# Patient Record
Sex: FEMALE | Race: WHITE | Employment: OTHER | URBAN - METROPOLITAN AREA
[De-identification: names, ages, dates, MRNs, and addresses within clinical notes are randomized per-mention and may not be internally consistent; named-entity substitution may affect disease eponyms.]

---

## 2017-09-28 ENCOUNTER — HOSPITAL ENCOUNTER (OUTPATIENT)
Dept: NUCLEAR MEDICINE | Age: 57
Discharge: HOME | End: 2017-09-28
Attending: INTERNAL MEDICINE
Payer: MEDICARE

## 2017-09-28 DIAGNOSIS — K21.9 GERD WITHOUT ESOPHAGITIS: ICD-10-CM

## 2017-09-28 PROCEDURE — 78264 GASTRIC EMPTYING IMG STUDY: CPT

## 2018-05-21 PROBLEM — D35.2 PROLACTINOMA (HCC): Status: ACTIVE | Noted: 2018-05-21

## 2018-08-13 ENCOUNTER — HOSPITAL ENCOUNTER (OUTPATIENT)
Dept: LAB | Age: 58
Discharge: HOME | End: 2018-08-13

## 2018-08-13 PROCEDURE — 88305 TISSUE EXAM BY PATHOLOGIST: CPT

## 2018-11-05 ENCOUNTER — HOSPITAL ENCOUNTER (OUTPATIENT)
Dept: MRI IMAGING | Age: 58
Discharge: HOME | End: 2018-11-05
Attending: INTERNAL MEDICINE
Payer: MEDICARE

## 2018-11-05 DIAGNOSIS — D35.2 PROLACTINOMA (HCC): ICD-10-CM

## 2018-11-05 PROCEDURE — 70553 MRI BRAIN STEM W/O & W/DYE: CPT

## 2018-11-05 PROCEDURE — A9575 INJ GADOTERATE MEGLUMI 0.1ML: HCPCS | Performed by: INTERNAL MEDICINE

## 2018-11-05 PROCEDURE — 74011250636 HC RX REV CODE- 250/636: Performed by: INTERNAL MEDICINE

## 2018-11-05 PROCEDURE — 74011000258 HC RX REV CODE- 258: Performed by: INTERNAL MEDICINE

## 2018-11-05 RX ORDER — SODIUM CHLORIDE 0.9 % (FLUSH) 0.9 %
10 SYRINGE (ML) INJECTION
Status: COMPLETED | OUTPATIENT
Start: 2018-11-05 | End: 2018-11-05

## 2018-11-05 RX ORDER — GADOTERATE MEGLUMINE 376.9 MG/ML
15 INJECTION INTRAVENOUS
Status: COMPLETED | OUTPATIENT
Start: 2018-11-05 | End: 2018-11-05

## 2018-11-05 RX ADMIN — GADOTERATE MEGLUMINE 15 ML: 376.9 INJECTION INTRAVENOUS at 12:26

## 2018-11-05 RX ADMIN — Medication 10 ML: at 12:26

## 2018-11-05 RX ADMIN — SODIUM CHLORIDE 100 ML: 900 INJECTION, SOLUTION INTRAVENOUS at 12:26

## 2019-08-05 ENCOUNTER — HOSPITAL ENCOUNTER (OUTPATIENT)
Dept: ULTRASOUND IMAGING | Age: 59
Discharge: HOME | End: 2019-08-05
Attending: INTERNAL MEDICINE
Payer: MEDICARE

## 2019-08-05 DIAGNOSIS — R22.1 FULLNESS OF NECK: ICD-10-CM

## 2019-08-05 PROCEDURE — 76536 US EXAM OF HEAD AND NECK: CPT

## 2019-08-05 NOTE — PROGRESS NOTES
Spoke with pt informed her that the area looks like a lipoma. She needs to let us know if it's getting larger.

## 2019-11-04 ENCOUNTER — HOSPITAL ENCOUNTER (OUTPATIENT)
Dept: MRI IMAGING | Age: 59
Discharge: HOME | End: 2019-11-04
Attending: INTERNAL MEDICINE
Payer: MEDICARE

## 2019-11-04 DIAGNOSIS — D35.2 PROLACTINOMA (HCC): ICD-10-CM

## 2019-11-04 DIAGNOSIS — E23.6 PITUITARY CYST (HCC): ICD-10-CM

## 2019-11-04 PROCEDURE — 74011000258 HC RX REV CODE- 258: Performed by: INTERNAL MEDICINE

## 2019-11-04 PROCEDURE — A9575 INJ GADOTERATE MEGLUMI 0.1ML: HCPCS | Performed by: INTERNAL MEDICINE

## 2019-11-04 PROCEDURE — 74011250636 HC RX REV CODE- 250/636: Performed by: INTERNAL MEDICINE

## 2019-11-04 PROCEDURE — 70553 MRI BRAIN STEM W/O & W/DYE: CPT

## 2019-11-04 RX ORDER — SODIUM CHLORIDE 0.9 % (FLUSH) 0.9 %
10 SYRINGE (ML) INJECTION
Status: COMPLETED | OUTPATIENT
Start: 2019-11-04 | End: 2019-11-04

## 2019-11-04 RX ORDER — GADOTERATE MEGLUMINE 376.9 MG/ML
16 INJECTION INTRAVENOUS
Status: COMPLETED | OUTPATIENT
Start: 2019-11-04 | End: 2019-11-04

## 2019-11-04 RX ADMIN — Medication 10 ML: at 10:49

## 2019-11-04 RX ADMIN — SODIUM CHLORIDE 100 ML: 900 INJECTION, SOLUTION INTRAVENOUS at 10:49

## 2019-11-04 RX ADMIN — GADOTERATE MEGLUMINE 16 ML: 376.9 INJECTION INTRAVENOUS at 10:49

## 2021-11-02 ENCOUNTER — HOSPITAL ENCOUNTER (OUTPATIENT)
Dept: MRI IMAGING | Age: 61
Discharge: HOME | End: 2021-11-02
Attending: INTERNAL MEDICINE
Payer: MEDICARE

## 2021-11-02 DIAGNOSIS — E23.6 PITUITARY CYST (HCC): ICD-10-CM

## 2021-11-02 PROCEDURE — 70553 MRI BRAIN STEM W/O & W/DYE: CPT

## 2021-11-02 PROCEDURE — A9576 INJ PROHANCE MULTIPACK: HCPCS | Performed by: INTERNAL MEDICINE

## 2021-11-02 PROCEDURE — 74011000258 HC RX REV CODE- 258: Performed by: INTERNAL MEDICINE

## 2021-11-02 PROCEDURE — 74011250636 HC RX REV CODE- 250/636: Performed by: INTERNAL MEDICINE

## 2021-11-02 RX ORDER — SODIUM CHLORIDE 0.9 % (FLUSH) 0.9 %
10 SYRINGE (ML) INJECTION
Status: COMPLETED | OUTPATIENT
Start: 2021-11-02 | End: 2021-11-02

## 2021-11-02 RX ADMIN — Medication 10 ML: at 13:57

## 2021-11-02 RX ADMIN — GADOTERIDOL 15 ML: 279.3 INJECTION, SOLUTION INTRAVENOUS at 13:57

## 2021-11-02 RX ADMIN — SODIUM CHLORIDE 100 ML: 900 INJECTION, SOLUTION INTRAVENOUS at 13:58

## 2022-03-19 PROBLEM — D35.2 PROLACTINOMA (HCC): Status: ACTIVE | Noted: 2018-05-21

## 2022-06-03 DIAGNOSIS — E03.9 PRIMARY HYPOTHYROIDISM: Primary | ICD-10-CM

## 2022-06-03 DIAGNOSIS — E03.9 PRIMARY HYPOTHYROIDISM: ICD-10-CM

## 2022-06-03 DIAGNOSIS — D35.2 PROLACTINOMA (HCC): Primary | ICD-10-CM

## 2022-06-03 DIAGNOSIS — E23.6 PITUITARY CYST (HCC): ICD-10-CM

## 2022-08-19 ENCOUNTER — HOSPITAL ENCOUNTER (OUTPATIENT)
Dept: LAB | Age: 62
Setting detail: SPECIMEN
Discharge: HOME | End: 2022-08-22

## 2022-08-19 PROCEDURE — 88305 TISSUE EXAM BY PATHOLOGIST: CPT

## 2022-08-19 PROCEDURE — 88312 SPECIAL STAINS GROUP 1: CPT

## 2022-11-08 DIAGNOSIS — D35.2 PROLACTINOMA (HCC): ICD-10-CM

## 2022-11-08 DIAGNOSIS — E23.6 PITUITARY CYST (HCC): ICD-10-CM

## 2022-11-08 LAB
PROLACTIN SERPL-MCNC: 8.2 NG/ML
TSH W FREE THYROID IF ABNORMAL: 1.83 UIU/ML (ref 0.36–3.74)

## 2022-11-13 NOTE — PROGRESS NOTES
Alexei Beard MD, [facility]            Reason for visit: Follow-up of hypothyroidism and a pituitary cyst      ASSESSMENT AND PLAN:    1. Primary hypothyroidism  She is biochemically euthyroid. Continue levothyroxine as prescribed. Return in 1 year with labs. - levothyroxine (SYNTHROID) 100 MCG tablet; Take 1 tablet by mouth every morning (before breakfast)  Dispense: 90 tablet; Refill: 3  - TSH; Future  - T4, Free; Future    2. Pituitary cyst [facility])  She has a small pituitary cyst incidentally noted on MRI ordered in 2018 as part of the evaluation of dizziness. The cyst increased slightly in size from  through . I will repeat MRI now. - Prolactin; Future  - MRI PITUITARY W WO CONTRAST; Future  - diazePAM (VALIUM) 5 MG tablet; Take 1 tablet on-call to MRI. May repeat once. Dispense: 2 tablet; Refill: 0      Follow-up and Dispositions    Return in about 1 year (around 2023). History of Present Illness:    PITUITARY DISEASE  Masoud Hankins is here for follow-up of a cystic pituitary lesion incidentally noted on MRI brain 2018 (ordered for evaluation of tinnitus, dizziness, headaches). She is grieving the unexpected death of her  due to COVID-19 in 2021. Current symptoms: See review of systems below     Pregnancy/menstrual history: She is . She had subfertility (it took ~2 years for her to achieve pregnancy). She underwent ALESSANDRO (ovaries intact) in the mid . She started experiencing hot flashes in her mid-40s and has taken estrogen/testosterone replacement since then. Pertinent comorbidities:  Current opioid use: Yes. (methadone for chronic pain since ~)     Current/prior corticosteroid use: Yes.  (she took prednisone for ~8 years in her 35s and has taken shorter courses since then)     Recreational drug use: No.     Significant alcohol use: No.     History of multiple endocrine neoplasia: No.     Imagin2018: MRI ([address] [place] [address])- 4 mm cystic lesion within the pituitary (cystic microadenoma versus Rathke's cleft cyst). 2018: MRI ([address] [place] [address])- 4 mm focus of T1 hyperintensity within the pars intermedia (intrasellar Rathke's cleft cyst versus complex pituitary cyst versus hemorrhagic pituitary microadenoma). 2019: MRI ([address] [place] [address])- 4 mm intrasellar lesion (pituitary microadenoma or complex Rathke's cleft cyst). 2021: MRI ([address] [place] [address])- 5.3 x 6.8 x 3.6 mm pituitary adenoma. Pituitary stalk is midline. Optic chiasm is unremarkable. Laboratory evaluation:  2018: Prolactin 24.6, IGF-I 94.  2018: Prolactin 16.1.  2019: Prolactin 19.0.  2020: Prolactin 11.7.  2021: Prolactin 20.9.  2022: Prolactin 8.2. Prior/current treatment: none        THYROID DYSFUNCTION  Tawana Chiu is seen for follow-up of hypothyroidism; this was diagnosed in her early 35s. Current symptoms: Review of systems below     Prior treatment: She has taken levothyroxine since diagnosis (100 mcg daily since at least ). Pertinent labs:  2011: TSH 2.316.  2014: TSH 1.95, free T4 0.89.  3/25/2015: TSH 2.29, free T4 0.79.  4/15/2016: TSH 0.66, free T4 1.01.  2017: TSH 1.680.  2018: TSH 5.680.  2018: TSH 3.770, free T4 1.28, antithyroid peroxidase antibodies 18 (-).  2018: TSH 2.940.  2019: TSH 2.590, free T4 1.31.  2020: TSH 1.136, free T4 1. 12.  2021: TSH 2.084.  2021: TSH 2.990, free T4 1.26.  2022: TSH 1.818.  2022: TSH 1.83. Imaging: none    Review of Systems   Constitutional:  Negative for fatigue and unexpected weight change (lost 21 pounds in the last year (she attributes to intentional reduction in caloric intake and increase in physical activity)). Positive for dry skin and hair loss. Eyes:  Negative for visual disturbance.    Cardiovascular:  Positive for palpitations (rare). Gastrointestinal:  Negative for constipation. Psychiatric/Behavioral:  Positive for sleep disturbance. /80   Pulse 74   Wt 142 lb (64.4 kg)   SpO2 94%   BMI 26.83 kg/m²   Wt Readings from Last 3 Encounters:   11/14/22 142 lb (64.4 kg)   11/12/21 163 lb (73.9 kg)       Physical Exam  HENT:      Head: Normocephalic. Neck:      Thyroid: No thyroid mass or thyromegaly. Cardiovascular:      Rate and Rhythm: Normal rate. Pulmonary:      Effort: No respiratory distress. Breath sounds: Normal breath sounds. Neurological:      Mental Status: She is alert. Psychiatric:         Mood and Affect: Mood normal.         Behavior: Behavior normal.       Orders Placed This Encounter   Procedures    MRI PITUITARY W WO CONTRAST     Standing Status:   Future     Standing Expiration Date:   11/14/2023     Order Specific Question:   STAT Creatinine as needed:     Answer:   Yes     Order Specific Question:   What is the sedation requirement? Answer:   None    TSH     Standing Status:   Future     Standing Expiration Date:   11/14/2024    T4, Free     Standing Status:   Future     Standing Expiration Date:   11/14/2024    Prolactin     Standing Status:   Future     Standing Expiration Date:   11/14/2024         Current Outpatient Medications   Medication Sig Dispense Refill    levothyroxine (SYNTHROID) 100 MCG tablet Take 1 tablet by mouth every morning (before breakfast) 90 tablet 3    diazePAM (VALIUM) 5 MG tablet Take 1 tablet on-call to MRI. May repeat once. 2 tablet 0    Calcium Carbonate-Vitamin D (OYSTER SHELL CALCIUM/D) 500-200 MG-UNIT TABS Take 1 tablet by mouth 2 times daily (with meals)      dicyclomine (BENTYL) 10 MG capsule Take 10 mg by mouth 3 times daily as needed      EPINEPHrine (EPIPEN) 0.3 MG/0.3ML SOAJ injection Epinephrine 0.3 mg/0.3 mL injection, auto-injector INJECT ONE PEN INTO THE THIGH OR AS DIRECTED BY PHYSICIAN. AFTER ADMINISTRATION CALL 911.  IF ANAPHYLACTIC SYMPTOMS PERSIST AFTER FIRST DOSE,      esomeprazole (NEXIUM) 40 MG delayed release capsule Take 40 mg by mouth daily      estrogens, conjugated,-methylTESTOSTERone (ESTRATEST) 1.25-2.5 MG per tablet Take 1 tablet by mouth daily. fluconazole (DIFLUCAN) 150 MG tablet May repeat after 3 days if needed. fluticasone (FLONASE) 50 MCG/ACT nasal spray 2 sprays by Nasal route daily      gabapentin (NEURONTIN) 300 MG capsule Take 300 mg by mouth daily. hydroCHLOROthiazide (HYDRODIURIL) 25 MG tablet Take 25 mg by mouth daily      lisinopril (PRINIVIL;ZESTRIL) 10 MG tablet Take 10 mg by mouth daily      loratadine (CLARITIN) 10 MG tablet Take by mouth      meloxicam (MOBIC) 7.5 MG tablet Take 7.5 mg by mouth daily      methadone (DOLOPHINE) 10 MG tablet Take 10 mg by mouth 4 times daily as needed. naloxone 4 MG/0.1ML LIQD nasal spray 4 mg      rOPINIRole (REQUIP) 1 MG tablet Take 1 mg by mouth       No current facility-administered medications for this visit. Jovani Sanders MD, FACE      Portions of this note were generated with the assistance of voice recognition software. As such, some errors in transcription may be present.

## 2022-11-14 ENCOUNTER — OFFICE VISIT (OUTPATIENT)
Dept: ENDOCRINOLOGY | Age: 62
End: 2022-11-14
Payer: MEDICARE

## 2022-11-14 VITALS
DIASTOLIC BLOOD PRESSURE: 80 MMHG | WEIGHT: 142 LBS | OXYGEN SATURATION: 94 % | BODY MASS INDEX: 26.83 KG/M2 | HEART RATE: 74 BPM | SYSTOLIC BLOOD PRESSURE: 118 MMHG

## 2022-11-14 DIAGNOSIS — E23.6 PITUITARY CYST (HCC): ICD-10-CM

## 2022-11-14 DIAGNOSIS — E03.9 PRIMARY HYPOTHYROIDISM: Primary | ICD-10-CM

## 2022-11-14 PROCEDURE — 3017F COLORECTAL CA SCREEN DOC REV: CPT | Performed by: INTERNAL MEDICINE

## 2022-11-14 PROCEDURE — 3078F DIAST BP <80 MM HG: CPT | Performed by: INTERNAL MEDICINE

## 2022-11-14 PROCEDURE — 3074F SYST BP LT 130 MM HG: CPT | Performed by: INTERNAL MEDICINE

## 2022-11-14 PROCEDURE — 4004F PT TOBACCO SCREEN RCVD TLK: CPT | Performed by: INTERNAL MEDICINE

## 2022-11-14 PROCEDURE — 99214 OFFICE O/P EST MOD 30 MIN: CPT | Performed by: INTERNAL MEDICINE

## 2022-11-14 PROCEDURE — G8484 FLU IMMUNIZE NO ADMIN: HCPCS | Performed by: INTERNAL MEDICINE

## 2022-11-14 PROCEDURE — G8427 DOCREV CUR MEDS BY ELIG CLIN: HCPCS | Performed by: INTERNAL MEDICINE

## 2022-11-14 PROCEDURE — G8419 CALC BMI OUT NRM PARAM NOF/U: HCPCS | Performed by: INTERNAL MEDICINE

## 2022-11-14 RX ORDER — DIAZEPAM 5 MG/1
TABLET ORAL
Qty: 2 TABLET | Refills: 0 | Status: SHIPPED | OUTPATIENT
Start: 2022-11-14 | End: 2023-01-01

## 2022-11-14 RX ORDER — LEVOTHYROXINE SODIUM 0.1 MG/1
100 TABLET ORAL
Qty: 90 TABLET | Refills: 3 | Status: SHIPPED | OUTPATIENT
Start: 2022-11-14

## 2022-11-14 ASSESSMENT — ENCOUNTER SYMPTOMS: CONSTIPATION: 0

## 2023-11-13 ENCOUNTER — TELEPHONE (OUTPATIENT)
Dept: ENDOCRINOLOGY | Age: 63
End: 2023-11-13

## 2023-11-13 DIAGNOSIS — D35.2 PROLACTINOMA (HCC): ICD-10-CM

## 2023-11-13 DIAGNOSIS — E23.6 PITUITARY CYST (HCC): Primary | ICD-10-CM

## 2023-11-13 NOTE — TELEPHONE ENCOUNTER
Patient was called to confirm her appointment and she stated that she wanted to reschedule because she hadn't got her mri. She need a new order for the mri and she also need a prescription for Valium in order to do the mri.

## 2023-11-14 RX ORDER — DIAZEPAM 5 MG/1
TABLET ORAL
Qty: 2 TABLET | Refills: 0 | Status: SHIPPED | OUTPATIENT
Start: 2023-11-14 | End: 2024-02-14

## 2023-12-14 ENCOUNTER — TELEPHONE (OUTPATIENT)
Dept: ENDOCRINOLOGY | Age: 63
End: 2023-12-14

## 2023-12-14 DIAGNOSIS — E23.6 PITUITARY CYST (HCC): ICD-10-CM

## 2023-12-14 DIAGNOSIS — D35.2 PROLACTINOMA (HCC): ICD-10-CM

## 2023-12-14 RX ORDER — DIAZEPAM 5 MG/1
TABLET ORAL
Qty: 2 TABLET | Refills: 0 | Status: SHIPPED | OUTPATIENT
Start: 2023-12-14 | End: 2024-03-15

## 2023-12-14 NOTE — TELEPHONE ENCOUNTER
Patient left a message stating the sedative was sent to the wrong pharmacy. Patient has MRI tomorrow and needs the medication. She wants it sent to [facility] in [place]. Patient would like a call back.

## 2023-12-15 ENCOUNTER — HOSPITAL ENCOUNTER (OUTPATIENT)
Dept: MRI IMAGING | Age: 63
End: 2023-12-15
Attending: INTERNAL MEDICINE
Payer: MEDICARE

## 2023-12-15 DIAGNOSIS — D35.2 PROLACTINOMA (HCC): ICD-10-CM

## 2023-12-15 DIAGNOSIS — E23.6 PITUITARY CYST (HCC): ICD-10-CM

## 2023-12-15 PROCEDURE — A9579 GAD-BASE MR CONTRAST NOS,1ML: HCPCS | Performed by: INTERNAL MEDICINE

## 2023-12-15 PROCEDURE — 2580000003 HC RX 258: Performed by: INTERNAL MEDICINE

## 2023-12-15 PROCEDURE — 6360000004 HC RX CONTRAST MEDICATION: Performed by: INTERNAL MEDICINE

## 2023-12-15 PROCEDURE — 70553 MRI BRAIN STEM W/O & W/DYE: CPT | Performed by: INTERNAL MEDICINE

## 2023-12-15 RX ORDER — SODIUM CHLORIDE 0.9 % (FLUSH) 0.9 %
10 SYRINGE (ML) INJECTION AS NEEDED
Status: ACTIVE | OUTPATIENT
Start: 2023-12-15

## 2023-12-15 RX ADMIN — SODIUM CHLORIDE, PRESERVATIVE FREE 10 ML: 5 INJECTION INTRAVENOUS at 16:41

## 2023-12-15 RX ADMIN — GADOTERIDOL 13 ML: 279.3 INJECTION, SOLUTION INTRAVENOUS at 16:41

## 2024-04-08 ENCOUNTER — OFFICE VISIT (OUTPATIENT)
Dept: ENDOCRINOLOGY | Age: 64
End: 2024-04-08
Payer: MEDICARE

## 2024-04-08 VITALS
SYSTOLIC BLOOD PRESSURE: 118 MMHG | DIASTOLIC BLOOD PRESSURE: 84 MMHG | WEIGHT: 142 LBS | BODY MASS INDEX: 26.83 KG/M2 | HEART RATE: 70 BPM

## 2024-04-08 DIAGNOSIS — E03.9 PRIMARY HYPOTHYROIDISM: ICD-10-CM

## 2024-04-08 DIAGNOSIS — E03.9 PRIMARY HYPOTHYROIDISM: Primary | ICD-10-CM

## 2024-04-08 DIAGNOSIS — E23.6 PITUITARY CYST (HCC): ICD-10-CM

## 2024-04-08 LAB
PROLACTIN SERPL-MCNC: 8.4 NG/ML
T4 FREE SERPL-MCNC: 1.2 NG/DL (ref 0.78–1.46)
TSH, 3RD GENERATION: 1 UIU/ML (ref 0.36–3.74)

## 2024-04-08 PROCEDURE — 3074F SYST BP LT 130 MM HG: CPT | Performed by: INTERNAL MEDICINE

## 2024-04-08 PROCEDURE — 3079F DIAST BP 80-89 MM HG: CPT | Performed by: INTERNAL MEDICINE

## 2024-04-08 PROCEDURE — G2211 COMPLEX E/M VISIT ADD ON: HCPCS | Performed by: INTERNAL MEDICINE

## 2024-04-08 PROCEDURE — 99214 OFFICE O/P EST MOD 30 MIN: CPT | Performed by: INTERNAL MEDICINE

## 2024-04-08 RX ORDER — LEVOTHYROXINE SODIUM 0.1 MG/1
100 TABLET ORAL
Qty: 90 TABLET | Refills: 3
Start: 2024-04-08 | End: 2024-04-09 | Stop reason: SDUPTHER

## 2024-04-08 ASSESSMENT — ENCOUNTER SYMPTOMS
DIARRHEA: 0
CONSTIPATION: 0

## 2024-04-08 NOTE — PROGRESS NOTES
IFRAH Haas MD, [facility] ENDOCRINOLOGY   AND   THYROID NODULE CLINIC            Reason for visit: Follow-up of hypothyroidism and a pituitary cyst      ASSESSMENT AND PLAN:    1. Primary hypothyroidism  She is biochemically euthyroid.  Continue levothyroxine as prescribed.  Return in 6 months with labs.  I will provide her with longitudinal care.  - levothyroxine (SYNTHROID) 100 MCG tablet; Take 1 tablet by mouth every morning (before breakfast)  Dispense: 90 tablet; Refill: 3  - TSH; Future  - T4, Free; Future    2. Pituitary cyst (HCC)  She has a small pituitary cyst incidentally noted on MRI ordered in 2018 as part of the evaluation of dizziness.  Her most recent MRI in 2023 demonstrated minimal enlargement of the cyst.  I will plan to repeat MRI in about 2 years.    Follow-up and Dispositions    Return in about 6 months (around 10/8/2024).           History of Present Illness:    PITUITARY DISEASE  Meghna Link is here for follow-up of a cystic pituitary lesion incidentally noted on MRI brain 2018 (ordered for evaluation of tinnitus, dizziness, headaches).  She is grieving the unexpected death of her  due to COVID-19 in 2021.     Current symptoms: See review of systems below     Pregnancy/menstrual history: She is .  She had subfertility (it took ~2 years for her to achieve pregnancy).  She underwent ALESSANDRO (ovaries intact) in the mid .  She started experiencing hot flashes in her mid-40s and has taken estrogen/testosterone replacement since then.     Pertinent comorbidities:  Current opioid use: Yes. (methadone for chronic pain since ~)     Current/prior corticosteroid use: Yes. (she took prednisone for ~8 years in her 30s and has taken shorter courses since then)     Recreational drug use: No.     Significant alcohol use: No.     History of multiple endocrine neoplasia: No.     Imagin2018: MRI ([place])- 4 mm cystic lesion within

## 2024-04-09 DIAGNOSIS — E03.9 PRIMARY HYPOTHYROIDISM: ICD-10-CM

## 2024-04-09 RX ORDER — LEVOTHYROXINE SODIUM 0.1 MG/1
100 TABLET ORAL
Qty: 90 TABLET | Refills: 3 | Status: SHIPPED | OUTPATIENT
Start: 2024-04-09

## 2024-10-03 DIAGNOSIS — E23.6 PITUITARY CYST (HCC): ICD-10-CM

## 2024-10-03 DIAGNOSIS — E03.9 PRIMARY HYPOTHYROIDISM: ICD-10-CM

## 2024-10-03 LAB
T4 FREE SERPL-MCNC: 1.4 NG/DL (ref 0.9–1.7)
TSH, 3RD GENERATION: 1.31 UIU/ML (ref 0.27–4.2)

## 2024-10-04 LAB — PROLACTIN SERPL-MCNC: 12.8 NG/ML (ref 4.8–23.3)

## 2024-10-08 ENCOUNTER — OFFICE VISIT (OUTPATIENT)
Dept: ENDOCRINOLOGY | Age: 64
End: 2024-10-08
Payer: MEDICARE

## 2024-10-08 VITALS — WEIGHT: 147 LBS | SYSTOLIC BLOOD PRESSURE: 118 MMHG | BODY MASS INDEX: 27.78 KG/M2 | DIASTOLIC BLOOD PRESSURE: 80 MMHG

## 2024-10-08 DIAGNOSIS — E23.6 PITUITARY CYST (HCC): ICD-10-CM

## 2024-10-08 DIAGNOSIS — E03.9 PRIMARY HYPOTHYROIDISM: Primary | ICD-10-CM

## 2024-10-08 PROCEDURE — G2211 COMPLEX E/M VISIT ADD ON: HCPCS | Performed by: INTERNAL MEDICINE

## 2024-10-08 PROCEDURE — 3079F DIAST BP 80-89 MM HG: CPT | Performed by: INTERNAL MEDICINE

## 2024-10-08 PROCEDURE — 3074F SYST BP LT 130 MM HG: CPT | Performed by: INTERNAL MEDICINE

## 2024-10-08 PROCEDURE — 99214 OFFICE O/P EST MOD 30 MIN: CPT | Performed by: INTERNAL MEDICINE

## 2024-10-08 ASSESSMENT — ENCOUNTER SYMPTOMS
CONSTIPATION: 0
DIARRHEA: 0

## 2024-10-08 NOTE — PROGRESS NOTES
IFRAH Haas MD, [facility] ENDOCRINOLOGY   AND   THYROID NODULE CLINIC            Reason for visit: Follow-up of hypothyroidism and a pituitary cyst      ASSESSMENT AND PLAN:    1. Primary hypothyroidism  She is biochemically euthyroid.  Continue levothyroxine as prescribed.  Return in 6 months with labs.  I will provide her with longitudinal care.  - levothyroxine (SYNTHROID) 100 MCG tablet; Take 1 tablet by mouth every morning (before breakfast)  Dispense: 90 tablet; Refill: 3  - TSH; Future  - T4, Free; Future    2. Pituitary cyst (HCC)  She has a small pituitary cyst incidentally noted on MRI ordered in 2018 as part of the evaluation of dizziness.  Her most recent MRI in 2023 demonstrated minimal enlargement of the cyst.  I will plan to repeat MRI at a 2 year interval.    Follow-up and Dispositions    Return in about 6 months (around 2025).             History of Present Illness:    PITUITARY DISEASE  Meghna Link is here for follow-up of a cystic pituitary lesion incidentally noted on MRI brain 2018 (ordered for evaluation of tinnitus, dizziness, headaches).  She is grieving the unexpected death of her  due to COVID-19 in 2021.     Current symptoms: See review of systems below     Pregnancy/menstrual history: She is .  She had subfertility (it took ~2 years for her to achieve pregnancy).  She underwent ALESSANDRO (ovaries intact) in the mid .  She started experiencing hot flashes in her mid-40s and has taken estrogen/testosterone replacement since then.     Pertinent comorbidities:  Current opioid use: Yes. (methadone for chronic pain since ~)     Current/prior corticosteroid use: Yes. (she took prednisone for ~8 years in her 30s and has taken shorter courses since then)     Recreational drug use: No.     Significant alcohol use: No.     History of multiple endocrine neoplasia: No.     Imagin2018: MRI ([place])- 4 mm cystic lesion

## 2025-04-07 DIAGNOSIS — E23.6 PITUITARY CYST: ICD-10-CM

## 2025-04-07 DIAGNOSIS — E03.9 PRIMARY HYPOTHYROIDISM: ICD-10-CM

## 2025-04-07 LAB
PROLACTIN SERPL-MCNC: 9.1 NG/ML (ref 4.8–23.3)
T4 FREE SERPL-MCNC: 1.4 NG/DL (ref 0.9–1.7)
TSH, 3RD GENERATION: 1.32 UIU/ML (ref 0.27–4.2)

## 2025-04-09 ENCOUNTER — OFFICE VISIT (OUTPATIENT)
Dept: ENDOCRINOLOGY | Age: 65
End: 2025-04-09
Payer: MEDICARE

## 2025-04-09 VITALS
BODY MASS INDEX: 28.32 KG/M2 | HEART RATE: 70 BPM | HEIGHT: 61 IN | SYSTOLIC BLOOD PRESSURE: 115 MMHG | DIASTOLIC BLOOD PRESSURE: 70 MMHG | OXYGEN SATURATION: 98 % | WEIGHT: 150 LBS

## 2025-04-09 DIAGNOSIS — E03.9 PRIMARY HYPOTHYROIDISM: Primary | ICD-10-CM

## 2025-04-09 DIAGNOSIS — E23.6 PITUITARY CYST: ICD-10-CM

## 2025-04-09 PROCEDURE — 3074F SYST BP LT 130 MM HG: CPT | Performed by: INTERNAL MEDICINE

## 2025-04-09 PROCEDURE — G2211 COMPLEX E/M VISIT ADD ON: HCPCS | Performed by: INTERNAL MEDICINE

## 2025-04-09 PROCEDURE — 3078F DIAST BP <80 MM HG: CPT | Performed by: INTERNAL MEDICINE

## 2025-04-09 PROCEDURE — 99213 OFFICE O/P EST LOW 20 MIN: CPT | Performed by: INTERNAL MEDICINE

## 2025-04-09 RX ORDER — LEVOTHYROXINE SODIUM 100 UG/1
100 TABLET ORAL
Qty: 90 TABLET | Refills: 3 | Status: SHIPPED | OUTPATIENT
Start: 2025-04-09

## 2025-04-09 ASSESSMENT — ENCOUNTER SYMPTOMS
CONSTIPATION: 0
DIARRHEA: 0

## 2025-04-09 NOTE — PROGRESS NOTES
THIGH OR AS DIRECTED BY PHYSICIAN. AFTER ADMINISTRATION CALL 911. IF ANAPHYLACTIC SYMPTOMS PERSIST AFTER FIRST DOSE,      esomeprazole (NEXIUM) 40 MG delayed release capsule Take 1 capsule by mouth daily      estrogens, conjugated,-methylTESTOSTERone (ESTRATEST) 1.25-2.5 MG per tablet Take 1 tablet by mouth daily.      fluconazole (DIFLUCAN) 150 MG tablet May repeat after 3 days if needed.      fluticasone (FLONASE) 50 MCG/ACT nasal spray 2 sprays by Nasal route daily      gabapentin (NEURONTIN) 300 MG capsule Take 1 capsule by mouth daily.      hydroCHLOROthiazide (HYDRODIURIL) 25 MG tablet Take 1 tablet by mouth daily      lisinopril (PRINIVIL;ZESTRIL) 10 MG tablet Take 1 tablet by mouth daily      loratadine (CLARITIN) 10 MG tablet Take by mouth      meloxicam (MOBIC) 7.5 MG tablet Take 1 tablet by mouth daily      methadone (DOLOPHINE) 10 MG tablet Take 1 tablet by mouth 4 times daily as needed.      naloxone 4 MG/0.1ML LIQD nasal spray 1 spray      rOPINIRole (REQUIP) 1 MG tablet Take 1 tablet by mouth       No current facility-administered medications for this visit.       IFRAH Haas MD, FACE      Portions of this note were generated with the assistance of voice recognition software.  As such, some errors in transcription may be present.

## 2025-04-14 ENCOUNTER — TRANSCRIBE ORDERS (OUTPATIENT)
Dept: SCHEDULING | Age: 65
End: 2025-04-14

## 2025-04-14 DIAGNOSIS — M54.16 RADICULOPATHY, LUMBAR REGION: Primary | ICD-10-CM
